# Patient Record
Sex: MALE
[De-identification: names, ages, dates, MRNs, and addresses within clinical notes are randomized per-mention and may not be internally consistent; named-entity substitution may affect disease eponyms.]

---

## 2017-03-08 ENCOUNTER — RECORD ABSTRACTING (OUTPATIENT)
Age: 32
End: 2017-03-08

## 2017-03-08 DIAGNOSIS — M41.9 SCOLIOSIS, UNSPECIFIED: ICD-10-CM

## 2017-03-08 DIAGNOSIS — C40.22 MALIGNANT NEOPLASM OF LONG BONES OF LEFT LOWER LIMB: ICD-10-CM

## 2017-03-08 DIAGNOSIS — C41.9 MALIGNANT NEOPLASM OF BONE AND ARTICULAR CARTILAGE, UNSPECIFIED: ICD-10-CM

## 2017-03-08 PROBLEM — Z00.00 ENCOUNTER FOR PREVENTIVE HEALTH EXAMINATION: Status: ACTIVE | Noted: 2017-03-08

## 2017-03-10 PROBLEM — C40.22 OSTEOSARCOMA OF LEFT FEMUR: Status: RESOLVED | Noted: 2017-03-10 | Resolved: 2017-03-10

## 2017-03-10 PROBLEM — C41.9: Status: RESOLVED | Noted: 2017-03-10 | Resolved: 2017-03-10

## 2017-03-10 PROBLEM — M41.9 SCOLIOSIS: Status: ACTIVE | Noted: 2017-03-10

## 2017-03-29 ENCOUNTER — APPOINTMENT (OUTPATIENT)
Dept: PEDIATRIC HEMATOLOGY/ONCOLOGY | Facility: CLINIC | Age: 32
End: 2017-03-29

## 2017-03-29 VITALS
HEART RATE: 73 BPM | SYSTOLIC BLOOD PRESSURE: 117 MMHG | WEIGHT: 146.61 LBS | DIASTOLIC BLOOD PRESSURE: 76 MMHG | BODY MASS INDEX: 22.22 KG/M2 | HEIGHT: 68 IN

## 2017-03-29 DIAGNOSIS — Z78.9 OTHER SPECIFIED HEALTH STATUS: ICD-10-CM

## 2017-03-29 DIAGNOSIS — Z08 ENCOUNTER FOR FOLLOW-UP EXAMINATION AFTER COMPLETED TREATMENT FOR MALIGNANT NEOPLASM: ICD-10-CM

## 2017-03-29 DIAGNOSIS — H91.93 UNSPECIFIED HEARING LOSS, BILATERAL: ICD-10-CM

## 2017-03-29 DIAGNOSIS — Z85.830 PERSONAL HISTORY OF MALIGNANT NEOPLASM OF BONE: ICD-10-CM

## 2017-03-29 DIAGNOSIS — Z91.89 OTHER SPECIFIED PERSONAL RISK FACTORS, NOT ELSEWHERE CLASSIFIED: ICD-10-CM

## 2017-03-29 DIAGNOSIS — Z92.21 PERSONAL HISTORY OF ANTINEOPLASTIC CHEMOTHERAPY: ICD-10-CM

## 2017-03-29 RX ORDER — CHOLECALCIFEROL (VITAMIN D3) 50 MCG
50 MCG TABLET ORAL DAILY
Refills: 0 | Status: ACTIVE | COMMUNITY

## 2017-03-31 PROBLEM — Z78.9 ALCOHOL INGESTION: Status: ACTIVE | Noted: 2017-03-31

## 2017-03-31 PROBLEM — Z85.830 HISTORY OF OSTEOSARCOMA: Status: ACTIVE | Noted: 2017-03-29

## 2017-03-31 PROBLEM — Z92.21 PERSONAL HISTORY OF ANTINEOPLASTIC CHEMOTHERAPY: Status: RESOLVED | Noted: 2017-03-08 | Resolved: 2017-03-31

## 2017-03-31 PROBLEM — Z91.89 AT RISK FOR CARDIOMYOPATHY: Status: ACTIVE | Noted: 2017-03-08

## 2017-03-31 PROBLEM — Z08 ENCOUNTER FOR ROUTINE CANCER FOLLOW-UP: Status: ACTIVE | Noted: 2017-03-08

## 2017-03-31 PROBLEM — H91.93 BILATERAL HEARING LOSS: Status: ACTIVE | Noted: 2017-03-08

## 2017-04-20 ENCOUNTER — RESULT REVIEW (OUTPATIENT)
Age: 32
End: 2017-04-20

## 2017-09-11 ENCOUNTER — RESULT REVIEW (OUTPATIENT)
Age: 32
End: 2017-09-11

## 2020-10-08 ENCOUNTER — APPOINTMENT (OUTPATIENT)
Dept: OTOLARYNGOLOGY | Facility: CLINIC | Age: 35
End: 2020-10-08
Payer: COMMERCIAL

## 2020-10-08 ENCOUNTER — TRANSCRIPTION ENCOUNTER (OUTPATIENT)
Age: 35
End: 2020-10-08

## 2020-10-08 VITALS
OXYGEN SATURATION: 98 % | HEIGHT: 68 IN | SYSTOLIC BLOOD PRESSURE: 133 MMHG | TEMPERATURE: 97.8 F | HEART RATE: 96 BPM | DIASTOLIC BLOOD PRESSURE: 86 MMHG

## 2020-10-08 DIAGNOSIS — H61.23 IMPACTED CERUMEN, BILATERAL: ICD-10-CM

## 2020-10-08 DIAGNOSIS — H90.3 SENSORINEURAL HEARING LOSS, BILATERAL: ICD-10-CM

## 2020-10-08 PROCEDURE — 92550 TYMPANOMETRY & REFLEX THRESH: CPT

## 2020-10-08 PROCEDURE — 69210 REMOVE IMPACTED EAR WAX UNI: CPT

## 2020-10-08 PROCEDURE — 92557 COMPREHENSIVE HEARING TEST: CPT

## 2020-10-08 PROCEDURE — 99204 OFFICE O/P NEW MOD 45 MIN: CPT | Mod: 25

## 2020-10-08 NOTE — CONSULT LETTER
[Dear  ___] : Dear  [unfilled], [Consult Letter:] : I had the pleasure of evaluating your patient, [unfilled]. [Please see my note below.] : Please see my note below. [Consult Closing:] : Thank you very much for allowing me to participate in the care of this patient.  If you have any questions, please do not hesitate to contact me. [Sincerely,] : Sincerely, [FreeTextEntry3] : KRISTINE Galaviz Jr, MD, FAAOHNS\par Otolaryngologist\par [facility]

## 2020-10-08 NOTE — ASSESSMENT
[FreeTextEntry1] : Hearing stable compared to an audio from 2013 that the pt showed on his phone; RTC 6 months for a cleaning and 1 yr for rpt audio.

## 2020-10-08 NOTE — HISTORY OF PRESENT ILLNESS
Bassam MANUEL Jacobsen is here for a medication check for his Adderall. Would like to increase the dose.    Questioned patient about current smoking habits.  Pt. quit smoking some time ago.  PULSE regular  My Chart: active  CLASSIFICATION OF OVERWEIGHT AND OBESITY BY BMI                        Obesity Class           BMI(kg/m2)  Underweight                                    < 18.5  Normal                                         18.5-24.9  Overweight                                     25.0-29.9  OBESITY                     I                  30.0-34.9                             II                 35.0-39.9  EXTREME OBESITY             III                >40                            Patient's  BMI Body mass index is 26.29 kg/m .  http://hin.nhlbi.nih.gov/menuplanner/menu.cgi  Pre-visit planning  Immunizations - up to date  Colonoscopy -   Mammogram -   Asthma -   PHQ9 -    GRAHAM-7 -      
[de-identified] : Longstanding hearing loss s/p adriamycin for osteosarcoma at age 9; this is stable per an audio many years ago, none recently. Bilateral high pitched nonpulsatile tinnitus; this is not terribly bothersome. No vertigo. Also w/ chronic cerumen impactions & was recently told he's packed. Doesn't use qtips. \par \par Covid-19 screening questions: \par   Sick contacts: no\par   Recent international travel: no\par   Shortness of breath: no\par   New or productive cough: no\par   Fevers/chills/night sweats: no\par   COVID-19 testing: neg Ab\par

## 2020-10-08 NOTE — PHYSICAL EXAM
[Binocular Microscopic Exam] : Binocular microscopic exam was performed [Normal] : no rashes [FreeTextEntry8] : deep cerumen impaction removed with suction after pretreatment w/ H2O2 [FreeTextEntry9] : deep cerumen impaction removed with suction after pretreatment w/ H2O2

## 2022-04-13 ENCOUNTER — TRANSCRIPTION ENCOUNTER (OUTPATIENT)
Age: 37
End: 2022-04-13

## 2022-04-15 ENCOUNTER — TRANSCRIPTION ENCOUNTER (OUTPATIENT)
Age: 37
End: 2022-04-15